# Patient Record
Sex: FEMALE | Race: BLACK OR AFRICAN AMERICAN | NOT HISPANIC OR LATINO | URBAN - METROPOLITAN AREA
[De-identification: names, ages, dates, MRNs, and addresses within clinical notes are randomized per-mention and may not be internally consistent; named-entity substitution may affect disease eponyms.]

---

## 2024-04-26 ENCOUNTER — APPOINTMENT (OUTPATIENT)
Dept: OBGYN | Facility: CLINIC | Age: 24
End: 2024-04-26

## 2024-05-02 ENCOUNTER — APPOINTMENT (OUTPATIENT)
Dept: RADIOLOGY | Facility: IMAGING CENTER | Age: 24
End: 2024-05-02
Payer: MEDICAID

## 2024-05-02 DIAGNOSIS — N91.2 AMENORRHEA: ICD-10-CM

## 2024-05-02 PROCEDURE — 76805 OB US >/= 14 WKS SNGL FETUS: CPT | Mod: TC | Performed by: NURSE PRACTITIONER

## 2024-05-04 PROBLEM — Z34.90 SUPERVISION OF NORMAL PREGNANCY: Status: ACTIVE | Noted: 2024-05-04

## 2024-05-08 ENCOUNTER — HOSPITAL ENCOUNTER (OUTPATIENT)
Facility: MEDICAL CENTER | Age: 24
End: 2024-05-08
Attending: PHYSICIAN ASSISTANT
Payer: MEDICAID

## 2024-05-08 ENCOUNTER — INITIAL PRENATAL (OUTPATIENT)
Dept: OBGYN | Facility: CLINIC | Age: 24
End: 2024-05-08
Payer: MEDICAID

## 2024-05-08 ENCOUNTER — APPOINTMENT (OUTPATIENT)
Dept: OBGYN | Facility: CLINIC | Age: 24
End: 2024-05-08
Payer: MEDICAID

## 2024-05-08 VITALS
DIASTOLIC BLOOD PRESSURE: 64 MMHG | SYSTOLIC BLOOD PRESSURE: 120 MMHG | HEIGHT: 67 IN | BODY MASS INDEX: 29.66 KG/M2 | WEIGHT: 189 LBS

## 2024-05-08 DIAGNOSIS — Z34.03 ENCOUNTER FOR SUPERVISION OF NORMAL FIRST PREGNANCY IN THIRD TRIMESTER: ICD-10-CM

## 2024-05-08 DIAGNOSIS — Z98.891 HISTORY OF C-SECTION: ICD-10-CM

## 2024-05-08 DIAGNOSIS — Z86.2 HISTORY OF ANEMIA: ICD-10-CM

## 2024-05-08 DIAGNOSIS — O09.33 LATE PRENATAL CARE AFFECTING PREGNANCY IN THIRD TRIMESTER: ICD-10-CM

## 2024-05-08 PROCEDURE — 3078F DIAST BP <80 MM HG: CPT | Performed by: PHYSICIAN ASSISTANT

## 2024-05-08 PROCEDURE — 3074F SYST BP LT 130 MM HG: CPT | Performed by: PHYSICIAN ASSISTANT

## 2024-05-08 PROCEDURE — 0500F INITIAL PRENATAL CARE VISIT: CPT | Performed by: PHYSICIAN ASSISTANT

## 2024-05-08 ASSESSMENT — EDINBURGH POSTNATAL DEPRESSION SCALE (EPDS)
I HAVE FELT SAD OR MISERABLE: NOT VERY OFTEN
I HAVE BEEN ABLE TO LAUGH AND SEE THE FUNNY SIDE OF THINGS: AS MUCH AS I ALWAYS COULD
THINGS HAVE BEEN GETTING ON TOP OF ME: YES, SOMETIMES I HAVEN'T BEEN COPING AS WELL AS USUAL
TOTAL SCORE: 7
I HAVE BEEN SO UNHAPPY THAT I HAVE BEEN CRYING: ONLY OCCASIONALLY
I HAVE LOOKED FORWARD WITH ENJOYMENT TO THINGS: AS MUCH AS I EVER DID
THE THOUGHT OF HARMING MYSELF HAS OCCURRED TO ME: NEVER
I HAVE FELT SCARED OR PANICKY FOR NO GOOD REASON: NO, NOT MUCH
I HAVE BLAMED MYSELF UNNECESSARILY WHEN THINGS WENT WRONG: NOT VERY OFTEN
I HAVE BEEN SO UNHAPPY THAT I HAVE HAD DIFFICULTY SLEEPING: NOT VERY OFTEN
I HAVE BEEN ANXIOUS OR WORRIED FOR NO GOOD REASON: NO, NOT AT ALL

## 2024-05-08 NOTE — LETTER
"Count Your Baby's Movements  Another step to a healthy delivery    [facility]  Dept: [phone]    How Many Weeks Pregnant?     Date to Begin Counting:               How to use this chart    One way for your physician to keep track of your baby's health is by knowing how often the baby moves (or \"kicks\") in your womb.  You can help your physician to do this by using this chart every day.    Every day, you should see how many hours it takes for your baby to move 10 times.  Start in the morning, as soon as you get up.    First, write down the time your baby moves until you get to 10.  Check off one box every time your baby moves until you get to 10.  Write down the time you finished counting in the last column.  Total how long it took to count up all 10 movements.  Finally, fill in the box that shows how long this took.  After counting 10 movements, you no longer have to count any more that day.  The next morning, just start counting again as soon as you get up.    What should you call a \"movement\"?  It is hard to say, because it will feel different from one mother to another and from one pregnancy to the next.  The important thing is that you count the movements the same way throughout your pregnancy.  If you have more questions, you should ask your physician.    Count carefully every day!  SAMPLE:  Week 28    How many hours did it take to feel 10 movements?       Start  Time     1     2     3     4     5     6     7     8     9     10   Finish Time   Mon 8:20           11:40   Tue Wed Thu               Fri               Sat               Sun                 IMPORTANT: You should contact your physician if it takes more than two hours for you to feel 10 movements.  Each morning, write down the time and start to count the movements of your baby.  Keep track by checking off one box every time you feel one movement.  When you have felt 10 " "\"kicks\", write down the time you finished counting in the last column.  Then fill in the   box (over the check logan) for the number of hours it took.  Be sure to read the complete instructions on the previous page.            "

## 2024-05-08 NOTE — PROGRESS NOTES
"Subjective     Ling Ojeda is a 23 y.o. female who presents with New ob visit. Pt is unsure of LMP. Dating is by US at 34wk, though pt reports US in [place] in 1st trimester - will request records.   OBHx: Hx primary C/S for NRFHT (dilated to 7 then needed urgent C/S), pt then had PTD/ at ?32 wk (baby weight over 5lb) and  at term - pt denies GDM, PIH or delivery complications  PMHx: Anemia - per last check, HgB ~10  SHX: C/S - denies surgical or anesthetic complications  Allergies: NKDA  Social: Denies tob, etoh or drug use - states she quit tob and marijuana during pregnancy  Meds; Taking PNV  Pt currently denies cramping, bleeding or pain. + FM.             HPI    Review of Systems   All other systems reviewed and are negative.             Objective     /64   Ht 5' 7\"   Wt 189 lb   LMP  (LMP Unknown)   BMI 29.60 kg/m²      Physical Exam  Vitals reviewed.   Constitutional:       Appearance: She is well-developed.   HENT:      Head: Normocephalic and atraumatic.   Eyes:      Pupils: Pupils are equal, round, and reactive to light.   Neck:      Thyroid: No thyromegaly.   Cardiovascular:      Rate and Rhythm: Normal rate and regular rhythm.      Heart sounds: Normal heart sounds.   Pulmonary:      Effort: Pulmonary effort is normal. No respiratory distress.      Breath sounds: Normal breath sounds.   Abdominal:      General: Bowel sounds are normal. There is no distension.      Palpations: Abdomen is soft.      Tenderness: There is no abdominal tenderness.   Genitourinary:     Exam position: Supine.      Labia:         Right: No rash or tenderness.         Left: No rash or tenderness.       Vagina: Normal. No signs of injury and foreign body. No vaginal discharge or erythema.      Cervix: No cervical motion tenderness.      Uterus: Enlarged (Gravid, uterus c/w 35 wk size). Not deviated and not tender.       Adnexa:         Right: No mass or tenderness.          Left: No mass or tenderness.   "   Musculoskeletal:      Cervical back: Normal range of motion and neck supple.   Skin:     General: Skin is warm and dry.      Findings: No erythema.   Neurological:      Mental Status: She is alert.      Deep Tendon Reflexes: Reflexes are normal and symmetric.   Psychiatric:         Behavior: Behavior normal.         Thought Content: Thought content normal.                             Assessment & Plan        1. History of  for NRFHT - wants TOLAC, no BTL  - Desires TOLAC, consent given - pt to bring back signed next visit    2. Encounter for supervision of normal first pregnancy in third trimester  - Late entry PNC - one early US in [place] - records requested  - Anatomy US wnl - pt notified of results  - Last PAP date unsure - will repeat after delivery  - GLUCOSE 1HR GESTATIONAL; Future  - PREG CNTR PRENATAL PN; Future  - URINE DRUG SCREEN W/CONF (AR); Future  - Chlamydia/GC, PCR (Genital/Anal swab); Future    3. Late prenatal care affecting pregnancy in third trimester  - Too late for AFP, declines NIPT    4. History of anemia  - Never tested for sickle cell disease  - Will check with CBC, pt taking PNV tabs

## 2024-05-08 NOTE — PROGRESS NOTES
Pt here today for OBFV   +FM movemnet  No VB, LOF. Uc's   Phone number   Pharmacy verified   YADY given to pt  TDAP-  FLU-  BTL-

## 2024-05-08 NOTE — PROGRESS NOTES
NOB today  LMP: unknown   Last pap:  unsure   Phone # [phone]  Pharmacy confirmed  On PNV  Genetic screening   EPDS= 7   c/o  wants to talk about getting induce   YADY given to pt   TDAP- declined   BTL- wants to go over

## 2024-05-09 LAB
C TRACH DNA GENITAL QL NAA+PROBE: NEGATIVE
N GONORRHOEA DNA GENITAL QL NAA+PROBE: NEGATIVE
SPECIMEN SOURCE: NORMAL

## 2024-05-10 ENCOUNTER — HOSPITAL ENCOUNTER (OUTPATIENT)
Dept: LAB | Facility: MEDICAL CENTER | Age: 24
End: 2024-05-10
Attending: PHYSICIAN ASSISTANT
Payer: MEDICAID

## 2024-05-10 DIAGNOSIS — Z34.03 ENCOUNTER FOR SUPERVISION OF NORMAL FIRST PREGNANCY IN THIRD TRIMESTER: ICD-10-CM

## 2024-05-10 LAB
ABO GROUP BLD: NORMAL
BLD GP AB SCN SERPL QL: NORMAL
ERYTHROCYTE [DISTWIDTH] IN BLOOD BY AUTOMATED COUNT: 41.6 FL (ref 35.9–50)
GLUCOSE 1H P 50 G GLC PO SERPL-MCNC: 72 MG/DL (ref 70–139)
HBV SURFACE AG SER QL: ABNORMAL
HCT VFR BLD AUTO: 33.3 % (ref 37–47)
HCV AB SER QL: ABNORMAL
HGB BLD-MCNC: 11.1 G/DL (ref 12–16)
HIV 1+2 AB+HIV1 P24 AG SERPL QL IA: NORMAL
MCH RBC QN AUTO: 30.1 PG (ref 27–33)
MCHC RBC AUTO-ENTMCNC: 33.3 G/DL (ref 32.2–35.5)
MCV RBC AUTO: 90.2 FL (ref 81.4–97.8)
PLATELET # BLD AUTO: 250 K/UL (ref 164–446)
PMV BLD AUTO: 10.4 FL (ref 9–12.9)
RBC # BLD AUTO: 3.69 M/UL (ref 4.2–5.4)
RH BLD: NORMAL
RUBV AB SER QL: 164 IU/ML
T PALLIDUM AB SER QL IA: ABNORMAL
WBC # BLD AUTO: 8.9 K/UL (ref 4.8–10.8)

## 2024-05-12 LAB
AMPHET CTO UR CFM-MCNC: NEGATIVE NG/ML
BACTERIA UR CULT: NORMAL
BARBITURATES CTO UR CFM-MCNC: NEGATIVE NG/ML
BENZODIAZ CTO UR CFM-MCNC: NEGATIVE NG/ML
CANNABINOIDS CTO UR CFM-MCNC: NEGATIVE NG/ML
COCAINE CTO UR CFM-MCNC: NEGATIVE NG/ML
CREAT UR-MCNC: 52.5 MG/DL (ref 20–400)
DRUG COMMENT 753798: NORMAL
METHADONE CTO UR CFM-MCNC: NEGATIVE NG/ML
OPIATES CTO UR CFM-MCNC: NEGATIVE NG/ML
PCP CTO UR CFM-MCNC: NEGATIVE NG/ML
PROPOXYPH CTO UR CFM-MCNC: NEGATIVE NG/ML
SIGNIFICANT IND 70042: NORMAL
SITE SITE: NORMAL
SOURCE SOURCE: NORMAL

## 2024-05-14 ENCOUNTER — HOSPITAL ENCOUNTER (OUTPATIENT)
Facility: MEDICAL CENTER | Age: 24
End: 2024-05-14
Payer: MEDICAID

## 2024-05-14 ENCOUNTER — ROUTINE PRENATAL (OUTPATIENT)
Dept: OBGYN | Facility: CLINIC | Age: 24
End: 2024-05-14
Payer: MEDICAID

## 2024-05-14 VITALS — BODY MASS INDEX: 29.44 KG/M2 | SYSTOLIC BLOOD PRESSURE: 100 MMHG | WEIGHT: 188 LBS | DIASTOLIC BLOOD PRESSURE: 60 MMHG

## 2024-05-14 DIAGNOSIS — Z98.891 HISTORY OF C-SECTION: ICD-10-CM

## 2024-05-14 DIAGNOSIS — Z34.03 ENCOUNTER FOR SUPERVISION OF NORMAL FIRST PREGNANCY IN THIRD TRIMESTER: ICD-10-CM

## 2024-05-14 PROCEDURE — 3078F DIAST BP <80 MM HG: CPT

## 2024-05-14 PROCEDURE — 3074F SYST BP LT 130 MM HG: CPT

## 2024-05-14 PROCEDURE — 0502F SUBSEQUENT PRENATAL CARE: CPT

## 2024-05-14 NOTE — PROGRESS NOTES
S: Pt is a 23 y.o.  at 36w0d gestation here today for routine prenatal care. Reports feeling overall well. Good FM, no LOF or VB. Reports some lower pelvic pain. Would like elective IOL close to 39 weeks.    O: /60   Wt 188 lb    *see prenatal flowsheet*     Labs:        GCT: 72   Vertex by BSUS    A: IUP at 36w0d       S=D         Patient Active Problem List    Diagnosis Date Noted    History of C/S x 1 for NRFHT with 2 VBACs - desires TOLAC, no BTL/BS 2024    Late prenatal care affecting pregnancy in third trimester - early US in [facility] - records requested 2024    History of anemia 2024    Supervision of normal pregnancy 2024     P:   -Discussed normal s/sx pregnancy appropriate for gestational age and labor warning signs vs general discomforts. Reviewed fetal movements appropriate for EGA and kick counts. Reinforced adequate hydration and nutrition.  -Discussed labor precautions, including 5-1-1 rule. Instructed to report to hospital with leaking of fluid, concerns for decreased FM, excessive vaginal bleeding.   -GBS collected today  -Discussed signs and symptoms of preeclampsia, including HA that is not resolved by rest/Tylenol/hydration, changes in vision, RUQ pain, or sudden increase in swelling.   -TOLAC counseling at [facility] in 1 weeks for routine prenatal care      Mia Rios C.N.M.

## 2024-05-14 NOTE — PROGRESS NOTES
Pt here today for OBFV   +FM movemnet  No VB, LOF. Uc's   Phone number /Pharmacy verified   GBS- today

## 2024-05-15 ENCOUNTER — TELEPHONE (OUTPATIENT)
Dept: OBGYN | Facility: CLINIC | Age: 24
End: 2024-05-15
Payer: MEDICAID

## 2024-05-15 NOTE — TELEPHONE ENCOUNTER
Received early ultra sound fax from [facility]. See media 05-15-24 for results.

## 2024-05-16 LAB — GP B STREP DNA SPEC QL NAA+PROBE: NEGATIVE

## 2024-05-24 ENCOUNTER — ROUTINE PRENATAL (OUTPATIENT)
Dept: OBGYN | Facility: CLINIC | Age: 24
End: 2024-05-24
Payer: MEDICAID

## 2024-05-24 VITALS — DIASTOLIC BLOOD PRESSURE: 54 MMHG | WEIGHT: 194.2 LBS | BODY MASS INDEX: 30.42 KG/M2 | SYSTOLIC BLOOD PRESSURE: 98 MMHG

## 2024-05-24 DIAGNOSIS — Z98.891 HISTORY OF C-SECTION: Primary | ICD-10-CM

## 2024-05-24 PROCEDURE — 0502F SUBSEQUENT PRENATAL CARE: CPT | Performed by: OBSTETRICS & GYNECOLOGY

## 2024-05-24 PROCEDURE — 3074F SYST BP LT 130 MM HG: CPT | Performed by: OBSTETRICS & GYNECOLOGY

## 2024-05-24 PROCEDURE — 3078F DIAST BP <80 MM HG: CPT | Performed by: OBSTETRICS & GYNECOLOGY

## 2024-05-24 NOTE — PROGRESS NOTES
S: Pt presents for routine OB follow up. Good fetal movement.  Reports intermittent contractions. Denies vaginal bleeding or leakage of fluid.    Questions answered.    O: BP 98/54   Wt 194 lb 3.2 oz   LMP  (LMP Unknown)   BMI 30.42 kg/m²   Patients' weight gain, fluid intake and exercise level discussed.  Vitals, fundal height , fetal position, and FHR reviewed on flowsheet    A/P:  23 y.o.  at 37w3d presents for routine obstetric follow-up.  Size equals dates and/or scan    Diagnoses and all orders for this visit:    History of C/S x 1 for NRFHT with 2 VBACs - desires TOLAC, no BTL/BS    - I discussed risks including uterine rupture and need for an emergency .  If this occurs, it can be catastrophic and result in lower Apgar scores and decreased blood flow to the baby.  In very rare, rare circumstances it can result in fetal death or neurological injury. I discussed that we will monitor the baby throughout her labor and if there are any signs of fetal distress, then we may move towards .  Benefits of  include less pain after delivery and earlier recovery. Also, if she desires a large family, it would be beneficial to try for vaginal birth to avoid multiple c-sections. TOLAC form signed. She is a good candidate because she has a history of two prior vaginal deliveries after .     She also desires elective IOL after 39w. She has been induced previously and understand that her labor may be longer if she is induced. Also, we can only give certain medications for induction if she has a history of a prior . All questions answered.      1.  Continue prenatal vitamins.  2.  Fetal kick counts.  3.  Exercise at least 30 minutes daily.  4.  Drink at least 2L of water daily  5.  Labor precautions educated.  6.  Follow-up in 1 weeks.    Shanel Rooney M.D.

## 2024-05-24 NOTE — PROGRESS NOTES
Pt here today for OBFV   +FM movemnet  No VB, LOF.   Uc's far apart   Phone number [phone]  Pharmacy verified   Pt states want to consult for a  and wants to get induced    GBS (-)

## 2024-05-30 ENCOUNTER — ROUTINE PRENATAL (OUTPATIENT)
Dept: OBGYN | Facility: CLINIC | Age: 24
End: 2024-05-30
Payer: MEDICAID

## 2024-05-30 VITALS — WEIGHT: 198 LBS | BODY MASS INDEX: 31.01 KG/M2 | DIASTOLIC BLOOD PRESSURE: 60 MMHG | SYSTOLIC BLOOD PRESSURE: 120 MMHG

## 2024-05-30 DIAGNOSIS — Z34.83 ENCOUNTER FOR SUPERVISION OF OTHER NORMAL PREGNANCY IN THIRD TRIMESTER: ICD-10-CM

## 2024-05-30 NOTE — PROGRESS NOTES
Pt here today for OBFV   +FM movemnet  No VB, LOF. Uc's   Phone number   Pharmacy verified   IOL- 6/5/24 instructions given     Would like her cervix checked

## 2024-05-30 NOTE — PROGRESS NOTES
S: Pt is a 23 y.o.  at 38w2d gestation here today for routine prenatal care. Reports feeling well overall, ready for labor and birth. Reports some BH contractions. No LOF or VB.     Some leg and hand swelling. Denies headache, visual changes, or epigastric pain.     O: /60   Wt 198 lb    *see prenatal flowsheet*     Labs:        GCT: 72        GBS: negative  Vertex by BSUS    A: IUP at 38w2d       S=D         Patient Active Problem List    Diagnosis Date Noted    History of C/S x 1 for NRFHT with 2 VBACs - desires TOLAC, no BTL/BS 2024    Late prenatal care affecting pregnancy in third trimester - early US in [facility] - records requested 2024    History of anemia 2024    Supervision of normal pregnancy 2024     P:   -Discussed normal s/sx pregnancy appropriate for gestational age and labor warning signs vs general discomforts. Reviewed fetal movements appropriate for EGA and kick counts. Reinforced adequate hydration and nutrition.  -Discussed labor precautions, including 5-1-1 rule. Instructed to report to hospital with leaking of fluid, concerns for decreased FM, excessive vaginal bleeding.   -Discussed signs and symptoms of preeclampsia, including HA that is not resolved by rest/Tylenol/hydration, changes in vision, RUQ pain, or sudden increase in swelling.   -SVE and sweep at [place] if desired  -IOL next week scheduled  -RTC in 1 weeks for routine prenatal care      Mia Rios C.N.M.

## 2024-06-01 ENCOUNTER — HOSPITAL ENCOUNTER (INPATIENT)
Facility: MEDICAL CENTER | Age: 24
LOS: 1 days | End: 2024-06-02
Attending: OBSTETRICS & GYNECOLOGY | Admitting: OBSTETRICS & GYNECOLOGY
Payer: MEDICAID

## 2024-06-01 ENCOUNTER — HOSPITAL ENCOUNTER (EMERGENCY)
Facility: MEDICAL CENTER | Age: 24
End: 2024-06-01
Payer: MEDICAID

## 2024-06-01 LAB
BASOPHILS # BLD AUTO: 0.3 % (ref 0–1.8)
BASOPHILS # BLD: 0.03 K/UL (ref 0–0.12)
EOSINOPHIL # BLD AUTO: 0.2 K/UL (ref 0–0.51)
EOSINOPHIL NFR BLD: 1.8 % (ref 0–6.9)
ERYTHROCYTE [DISTWIDTH] IN BLOOD BY AUTOMATED COUNT: 43.8 FL (ref 35.9–50)
HCT VFR BLD AUTO: 32.2 % (ref 37–47)
HGB BLD-MCNC: 10.6 G/DL (ref 12–16)
HOLDING TUBE BB 8507: NORMAL
IMM GRANULOCYTES # BLD AUTO: 0.46 K/UL (ref 0–0.11)
IMM GRANULOCYTES NFR BLD AUTO: 4.2 % (ref 0–0.9)
LYMPHOCYTES # BLD AUTO: 2.29 K/UL (ref 1–4.8)
LYMPHOCYTES NFR BLD: 21 % (ref 22–41)
MCH RBC QN AUTO: 29.5 PG (ref 27–33)
MCHC RBC AUTO-ENTMCNC: 32.9 G/DL (ref 32.2–35.5)
MCV RBC AUTO: 89.7 FL (ref 81.4–97.8)
MONOCYTES # BLD AUTO: 1.21 K/UL (ref 0–0.85)
MONOCYTES NFR BLD AUTO: 11.1 % (ref 0–13.4)
NEUTROPHILS # BLD AUTO: 6.71 K/UL (ref 1.82–7.42)
NEUTROPHILS NFR BLD: 61.6 % (ref 44–72)
NRBC # BLD AUTO: 0 K/UL
NRBC BLD-RTO: 0 /100 WBC (ref 0–0.2)
PLATELET # BLD AUTO: 230 K/UL (ref 164–446)
PMV BLD AUTO: 9.7 FL (ref 9–12.9)
RBC # BLD AUTO: 3.59 M/UL (ref 4.2–5.4)
T PALLIDUM AB SER QL IA: NORMAL
WBC # BLD AUTO: 10.9 K/UL (ref 4.8–10.8)

## 2024-06-01 PROCEDURE — A9270 NON-COVERED ITEM OR SERVICE: HCPCS | Performed by: OBSTETRICS & GYNECOLOGY

## 2024-06-01 PROCEDURE — 86780 TREPONEMA PALLIDUM: CPT

## 2024-06-01 PROCEDURE — 700102 HCHG RX REV CODE 250 W/ 637 OVERRIDE(OP): Performed by: OBSTETRICS & GYNECOLOGY

## 2024-06-01 PROCEDURE — 85025 COMPLETE CBC W/AUTO DIFF WBC: CPT

## 2024-06-01 PROCEDURE — 700111 HCHG RX REV CODE 636 W/ 250 OVERRIDE (IP): Mod: UD

## 2024-06-01 PROCEDURE — 59409 OBSTETRICAL CARE: CPT

## 2024-06-01 PROCEDURE — 770002 HCHG ROOM/CARE - OB PRIVATE (112)

## 2024-06-01 PROCEDURE — A9270 NON-COVERED ITEM OR SERVICE: HCPCS

## 2024-06-01 PROCEDURE — 700102 HCHG RX REV CODE 250 W/ 637 OVERRIDE(OP)

## 2024-06-01 PROCEDURE — 36415 COLL VENOUS BLD VENIPUNCTURE: CPT

## 2024-06-01 PROCEDURE — 304965 HCHG RECOVERY SERVICES

## 2024-06-01 RX ORDER — TERBUTALINE SULFATE 1 MG/ML
0.25 INJECTION, SOLUTION SUBCUTANEOUS
Status: DISCONTINUED | OUTPATIENT
Start: 2024-06-01 | End: 2024-06-02 | Stop reason: HOSPADM

## 2024-06-01 RX ORDER — OXYTOCIN 10 [USP'U]/ML
10 INJECTION, SOLUTION INTRAMUSCULAR; INTRAVENOUS
Status: COMPLETED | OUTPATIENT
Start: 2024-06-01 | End: 2024-06-01

## 2024-06-01 RX ORDER — ACETAMINOPHEN 500 MG
1000 TABLET ORAL
Status: COMPLETED | OUTPATIENT
Start: 2024-06-01 | End: 2024-06-01

## 2024-06-01 RX ORDER — IBUPROFEN 800 MG/1
800 TABLET ORAL
Status: DISCONTINUED | OUTPATIENT
Start: 2024-06-01 | End: 2024-06-01

## 2024-06-01 RX ORDER — MISOPROSTOL 200 UG/1
800 TABLET ORAL ONCE
Status: COMPLETED | OUTPATIENT
Start: 2024-06-01 | End: 2024-06-01

## 2024-06-01 RX ORDER — SODIUM CHLORIDE, SODIUM LACTATE, POTASSIUM CHLORIDE, CALCIUM CHLORIDE 600; 310; 30; 20 MG/100ML; MG/100ML; MG/100ML; MG/100ML
INJECTION, SOLUTION INTRAVENOUS CONTINUOUS
Status: DISCONTINUED | OUTPATIENT
Start: 2024-06-01 | End: 2024-06-02 | Stop reason: HOSPADM

## 2024-06-01 RX ORDER — MISOPROSTOL 200 UG/1
TABLET ORAL
Status: COMPLETED
Start: 2024-06-01 | End: 2024-06-01

## 2024-06-01 RX ORDER — OXYTOCIN 10 [USP'U]/ML
INJECTION, SOLUTION INTRAMUSCULAR; INTRAVENOUS
Status: COMPLETED
Start: 2024-06-01 | End: 2024-06-01

## 2024-06-01 RX ORDER — METHYLERGONOVINE MALEATE 0.2 MG/ML
INJECTION INTRAVENOUS
Status: COMPLETED
Start: 2024-06-01 | End: 2024-06-01

## 2024-06-01 RX ORDER — LIDOCAINE HYDROCHLORIDE 10 MG/ML
20 INJECTION, SOLUTION INFILTRATION; PERINEURAL
Status: DISCONTINUED | OUTPATIENT
Start: 2024-06-01 | End: 2024-06-02 | Stop reason: HOSPADM

## 2024-06-01 RX ORDER — METHYLERGONOVINE MALEATE 0.2 MG/ML
0.2 INJECTION INTRAVENOUS ONCE
Status: COMPLETED | OUTPATIENT
Start: 2024-06-01 | End: 2024-06-01

## 2024-06-01 RX ORDER — IBUPROFEN 800 MG/1
800 TABLET ORAL EVERY 8 HOURS PRN
Status: DISCONTINUED | OUTPATIENT
Start: 2024-06-01 | End: 2024-06-02 | Stop reason: HOSPADM

## 2024-06-01 RX ADMIN — OXYTOCIN: 10 INJECTION, SOLUTION INTRAMUSCULAR; INTRAVENOUS at 18:15

## 2024-06-01 RX ADMIN — ACETAMINOPHEN 1000 MG: 500 TABLET, FILM COATED ORAL at 18:54

## 2024-06-01 RX ADMIN — MISOPROSTOL 800 MCG: 200 TABLET ORAL at 18:15

## 2024-06-01 RX ADMIN — IBUPROFEN 800 MG: 800 TABLET, FILM COATED ORAL at 19:52

## 2024-06-01 ASSESSMENT — LIFESTYLE VARIABLES: EVER_SMOKED: NEVER

## 2024-06-01 ASSESSMENT — PATIENT HEALTH QUESTIONNAIRE - PHQ9
2. FEELING DOWN, DEPRESSED, IRRITABLE, OR HOPELESS: NOT AT ALL
SUM OF ALL RESPONSES TO PHQ9 QUESTIONS 1 AND 2: 0
1. LITTLE INTEREST OR PLEASURE IN DOING THINGS: NOT AT ALL

## 2024-06-01 ASSESSMENT — PAIN DESCRIPTION - PAIN TYPE
TYPE: ACUTE PAIN
TYPE: ACUTE PAIN

## 2024-06-02 VITALS
BODY MASS INDEX: 31.39 KG/M2 | SYSTOLIC BLOOD PRESSURE: 107 MMHG | TEMPERATURE: 98.3 F | OXYGEN SATURATION: 98 % | DIASTOLIC BLOOD PRESSURE: 57 MMHG | HEIGHT: 67 IN | HEART RATE: 83 BPM | RESPIRATION RATE: 16 BRPM | WEIGHT: 200 LBS

## 2024-06-02 LAB
ERYTHROCYTE [DISTWIDTH] IN BLOOD BY AUTOMATED COUNT: 42.8 FL (ref 35.9–50)
HCT VFR BLD AUTO: 34.4 % (ref 37–47)
HGB BLD-MCNC: 11.5 G/DL (ref 12–16)
MCH RBC QN AUTO: 29.5 PG (ref 27–33)
MCHC RBC AUTO-ENTMCNC: 33.4 G/DL (ref 32.2–35.5)
MCV RBC AUTO: 88.2 FL (ref 81.4–97.8)
PLATELET # BLD AUTO: 266 K/UL (ref 164–446)
PMV BLD AUTO: 9.8 FL (ref 9–12.9)
RBC # BLD AUTO: 3.9 M/UL (ref 4.2–5.4)
WBC # BLD AUTO: 15.6 K/UL (ref 4.8–10.8)

## 2024-06-02 PROCEDURE — 3E0234Z INTRODUCTION OF SERUM, TOXOID AND VACCINE INTO MUSCLE, PERCUTANEOUS APPROACH: ICD-10-PCS | Performed by: OBSTETRICS & GYNECOLOGY

## 2024-06-02 PROCEDURE — 90715 TDAP VACCINE 7 YRS/> IM: CPT

## 2024-06-02 PROCEDURE — 90471 IMMUNIZATION ADMIN: CPT

## 2024-06-02 PROCEDURE — 36415 COLL VENOUS BLD VENIPUNCTURE: CPT

## 2024-06-02 PROCEDURE — A9270 NON-COVERED ITEM OR SERVICE: HCPCS

## 2024-06-02 PROCEDURE — 700102 HCHG RX REV CODE 250 W/ 637 OVERRIDE(OP)

## 2024-06-02 PROCEDURE — 700111 HCHG RX REV CODE 636 W/ 250 OVERRIDE (IP)

## 2024-06-02 PROCEDURE — 85027 COMPLETE CBC AUTOMATED: CPT

## 2024-06-02 RX ORDER — SIMETHICONE 125 MG
125 TABLET,CHEWABLE ORAL 4 TIMES DAILY PRN
Status: DISCONTINUED | OUTPATIENT
Start: 2024-06-02 | End: 2024-06-02 | Stop reason: HOSPADM

## 2024-06-02 RX ORDER — SODIUM CHLORIDE, SODIUM LACTATE, POTASSIUM CHLORIDE, CALCIUM CHLORIDE 600; 310; 30; 20 MG/100ML; MG/100ML; MG/100ML; MG/100ML
INJECTION, SOLUTION INTRAVENOUS PRN
Status: DISCONTINUED | OUTPATIENT
Start: 2024-06-02 | End: 2024-06-02 | Stop reason: HOSPADM

## 2024-06-02 RX ORDER — ACETAMINOPHEN 500 MG
1000 TABLET ORAL EVERY 6 HOURS PRN
Qty: 30 TABLET | Refills: 0 | Status: SHIPPED | OUTPATIENT
Start: 2024-06-02

## 2024-06-02 RX ORDER — POLYETHYLENE GLYCOL 3350 17 G/17G
1 POWDER, FOR SOLUTION ORAL
Status: DISCONTINUED | OUTPATIENT
Start: 2024-06-02 | End: 2024-06-02 | Stop reason: HOSPADM

## 2024-06-02 RX ORDER — IBUPROFEN 800 MG/1
800 TABLET ORAL EVERY 8 HOURS PRN
Qty: 30 TABLET | Refills: 0 | Status: SHIPPED | OUTPATIENT
Start: 2024-06-02

## 2024-06-02 RX ORDER — DOCUSATE SODIUM 100 MG/1
100 CAPSULE, LIQUID FILLED ORAL 2 TIMES DAILY PRN
Qty: 30 CAPSULE | Refills: 0 | Status: SHIPPED | OUTPATIENT
Start: 2024-06-02 | End: 2024-06-09

## 2024-06-02 RX ORDER — ACETAMINOPHEN 500 MG
1000 TABLET ORAL EVERY 6 HOURS PRN
Status: DISCONTINUED | OUTPATIENT
Start: 2024-06-02 | End: 2024-06-02 | Stop reason: HOSPADM

## 2024-06-02 RX ADMIN — IBUPROFEN 800 MG: 800 TABLET, FILM COATED ORAL at 04:07

## 2024-06-02 RX ADMIN — ACETAMINOPHEN 1000 MG: 500 TABLET, FILM COATED ORAL at 12:56

## 2024-06-02 RX ADMIN — TETANUS TOXOID, REDUCED DIPHTHERIA TOXOID AND ACELLULAR PERTUSSIS VACCINE, ADSORBED 0.5 ML: 5; 2.5; 8; 8; 2.5 SUSPENSION INTRAMUSCULAR at 16:40

## 2024-06-02 RX ADMIN — ACETAMINOPHEN 1000 MG: 500 TABLET, FILM COATED ORAL at 04:07

## 2024-06-02 RX ADMIN — IBUPROFEN 800 MG: 800 TABLET, FILM COATED ORAL at 12:56

## 2024-06-02 ASSESSMENT — PAIN DESCRIPTION - PAIN TYPE
TYPE: ACUTE PAIN;SURGICAL PAIN
TYPE: ACUTE PAIN

## 2024-06-02 ASSESSMENT — EDINBURGH POSTNATAL DEPRESSION SCALE (EPDS)
I HAVE BEEN ABLE TO LAUGH AND SEE THE FUNNY SIDE OF THINGS: AS MUCH AS I ALWAYS COULD
I HAVE BLAMED MYSELF UNNECESSARILY WHEN THINGS WENT WRONG: NOT VERY OFTEN
I HAVE LOOKED FORWARD WITH ENJOYMENT TO THINGS: AS MUCH AS I EVER DID
THE THOUGHT OF HARMING MYSELF HAS OCCURRED TO ME: NEVER
I HAVE BEEN SO UNHAPPY THAT I HAVE HAD DIFFICULTY SLEEPING: NOT VERY OFTEN
I HAVE BEEN SO UNHAPPY THAT I HAVE BEEN CRYING: ONLY OCCASIONALLY
I HAVE BEEN ANXIOUS OR WORRIED FOR NO GOOD REASON: HARDLY EVER
I HAVE FELT SCARED OR PANICKY FOR NO GOOD REASON: NO, NOT MUCH
THINGS HAVE BEEN GETTING ON TOP OF ME: NO, MOST OF THE TIME I HAVE COPED QUITE WELL
I HAVE FELT SAD OR MISERABLE: NOT VERY OFTEN

## 2024-06-02 NOTE — CARE PLAN
The patient is Stable - Low risk of patient condition declining or worsening    Shift Goals  Clinical Goals: VS and Lochia WNL  Patient Goals: rest    Progress made toward(s) clinical / shift goals:    Problem: Knowledge Deficit - L&D  Goal: Patient and family/caregivers will demonstrate understanding of plan of care, disease process/condition, diagnostic tests and medications  Outcome: Progressing  Note: Oriented patient to unit, discussed bed controls and call light. Educated patient on labor and delivery process. Discussed POC with patient and support persons, patient agrees to and verbalized understanding of POC. All questions answered at this time.      Problem: Pain - Standard  Goal: Alleviation of pain or a reduction in pain to the patient’s comfort goal  Outcome: Progressing  Flowsheets (Taken 6/1/2024 2033)  Pain Rating Scale (NPRS): 5  Note: Patient reports decrease in pain post interventions. Patient able to notify RN when interventions are needed.       Patient is not progressing towards the following goals:

## 2024-06-02 NOTE — PROGRESS NOTES
0800 Assessment completed. Lochia light, fundus firm. Plan of care reviewed. Declines pain intervention at this time, will call if pain intervention needed.

## 2024-06-02 NOTE — CARE PLAN
The patient is Stable - Low risk of patient condition declining or worsening    Shift Goals  Clinical Goals: Patiet VSS; Lochia WDL; Pain WDL  Patient Goals: rest    Progress made toward(s) clinical / shift goals:    Problem: Knowledge Deficit - L&D  Goal: Patient and family/caregivers will demonstrate understanding of plan of care, disease process/condition, diagnostic tests and medications  Outcome: Met     Problem: Risk for Excess Fluid Volume  Goal: Patient will demonstrate pulse, blood pressure and neurologic signs within expected ranges and without any respiratory complications  Outcome: Met     Problem: Pain - Standard  Goal: Alleviation of pain or a reduction in pain to the patient’s comfort goal  Outcome: Met     Problem: Knowledge Deficit - Standard  Goal: Patient and family/care givers will demonstrate understanding of plan of care, disease process/condition, diagnostic tests and medications  Outcome: Met     Problem: Knowledge Deficit - Postpartum  Goal: Patient will verbalize and demonstrate understanding of self and infant care  Outcome: Met     Problem: Psychosocial - Postpartum  Goal: Patient will verbalize and demonstrate effective bonding and parenting behavior  Outcome: Met     Problem: Altered Physiologic Condition  Goal: Patient physiologically stable as evidenced by normal lochia, palpable uterine involution and vitals within normal limits  Outcome: Met     Problem: Infection - Postpartum  Goal: Postpartum patient will be free of signs and symptoms of infection  Outcome: Met       Patient is not progressing towards the following goals:

## 2024-06-02 NOTE — DISCHARGE SUMMARY
Discharge Summary:     Date of Admission: 2024  Date of Discharge: 24    Admitting diagnosis:    1. Pregnancy @ 38w4d  2. Active labor   3. History of C/S x 1 for NRFHT with 2 VBACs   4. Late prenatal care  5. History of anemia     Discharge Diagnosis:   1. Status post vaginal, spontaneous.  2. Same as above     Past Medical History:   Diagnosis Date    Iron deficiency     Urinary tract infection      OB History    Para Term  AB Living   4 4 3 1   4   SAB IAB Ectopic Molar Multiple Live Births           0 4      # Outcome Date GA Lbr Ramy/2nd Weight Sex Delivery Anes PTL Lv   4 Term 24 38w4d  2.965 kg (6 lb 8.6 oz) F Vag-Spont None N DULCE   3 Term 23 37w0d  2.994 kg (6 lb 9.6 oz) M    DULCE   2  20 32w0d  2.495 kg (5 lb 8 oz) M  EPI  DULCE   1 Term 19 39w0d  3.629 kg (8 lb) M CS-LTranv   DULCE     Past Surgical History:   Procedure Laterality Date    PRIMARY C SECTION  2019     Patient has no known allergies.    Patient Active Problem List   Diagnosis    Supervision of normal pregnancy    History of C/S x 1 for NRFHT with 2 VBACs - desires TOLAC, no BTL/BS    Late prenatal care affecting pregnancy in third trimester - early US in [facility] - records requested    History of anemia    Labor and delivery indication for care or intervention    Indication for care in labor or delivery       Hospital Course:   Pt is a 23 y.o. now  who presented on 2024 for active labor and underwent precipitous delivery. Single female infant was delivered via  at 1810 under no anesthesia; no lacerations were noted. Apgars 8, 9 at 1 and 5 minutes respectively.  ml.     Postpartum course notable for early ambulation, well managed pain, tolerance of diet, spontaneous voiding, and appropriate feeding of infant. She has remained afebrile and blood pressure has been well controlled. All maternal questions and concerns addressed.     Physical Exam:  Temp:  [36.6  °C (97.8 °F)-37 °C (98.6 °F)] 36.8 °C (98.3 °F)  Pulse:  [73-97] 83  Resp:  [16-22] 16  BP: (101-121)/(51-68) 107/57  SpO2:  [96 %-98 %] 98 %  Physical Exam  General: well appearing, no apparent distress  Abdomen: soft, nontender, nondistended  Fundus: firm at level below umbilicus, nontender   Perineum: Deferred  Extremities: symmetric, no peripheral edema, calves nontender    Current Facility-Administered Medications   Medication Dose    lactated ringers infusion      acetaminophen (Tylenol) tablet 1,000 mg  1,000 mg    tetanus-dipth-acell pertussis (Tdap) inj 0.5 mL  0.5 mL    measles, mumps and rubella vaccine (Mmr) injection 0.5 mL  0.5 mL    polyethylene glycol/lytes (Miralax) Packet 1 Packet  1 Packet    simethicone (Mylicon) chewable tablet 125 mg  125 mg    LR infusion      oxytocin (Pitocin) infusion bolus (for post delivery)  20 Units    Followed by    oxytocin (Pitocin) infusion (for post delivery)  125 mL/hr    ibuprofen (Motrin) tablet 800 mg  800 mg       Recent Labs     06/01/24  1857 06/02/24  0504   WBC 10.9* 15.6*   RBC 3.59* 3.90*   HEMOGLOBIN 10.6* 11.5*   HEMATOCRIT 32.2* 34.4*   MCV 89.7 88.2   MCH 29.5 29.5   MCHC 32.9 33.4   RDW 43.8 42.8   PLATELETCT 230 266   MPV 9.7 9.8       Activity/ Discharge Instructions::   Discharge to home  Pelvic Rest x 6 weeks  No heavy lifting x4 weeks  Call or come to ED for: heavy vaginal bleeding, fever >100.4, severe abdominal pain, severe headache, chest pain, shortness of breath,  N/V, or other concerns.    Follow up:  [facility] in 5 weeks for vaginal delivery    Discharge Meds:   Current Outpatient Medications   Medication Sig Dispense Refill    acetaminophen (TYLENOL) 500 MG Tab Take 2 Tablets by mouth every 6 hours as needed for Mild Pain or Moderate Pain. 30 Tablet 0    ibuprofen (MOTRIN) 800 MG Tab Take 1 Tablet by mouth every 8 hours as needed for Mild Pain or Moderate Pain. 30 Tablet 0    docusate sodium (COLACE) 100 MG Cap Take 1  Capsule by mouth 2 times a day as needed for Constipation for up to 7 days. 30 Capsule 0       Rosmery Naqvi D.O.  PGY-1, [facility]

## 2024-06-02 NOTE — DISCHARGE PLANNING
"Discharge Planning Assessment Post Partum    Spoke with Mom at bedside    Report made to [facility] worker Jacki for +THC use. Per Jacki Information only at this time unless calls back after talking to Supervisor.    Reason for Referral: \"MOB history of anxiety and THC use. This patient has no support system. She has some family that live a couple hours away however she is living alone with her children with no friends or family to assist her.\"   Address: [facility] [address] [place] [address] [place] [address]  Type of Living Situation: Lives with 3 other children - Recently moved to this address, was previously living in [facility] since moving to [place] in February. Moved from [place] needing \"a change\"   Mom Diagnosis: Pregnancy; Vaginal delivery ( x3)  Baby Diagnosis:  38w4d  Primary Language: English    Name of Baby: Still deciding  Father of the Baby:   Involved in baby’s care? N/a  Contact Information: n/a    Prenatal Care: LPNC - 1 ultrasound visit in [place], then started PNC at [facility]  Mom's PCP: [facility]  PCP for new baby:Dr. Ridley    Support System: Family in [place], says have made some friends since moving here  Coping/Bonding between mother & baby: Yes  Source of Feeding: Bottle/Formula  Supplies for Infant: Needs Formula, Crib (discussed not having baby sleep in bed with her and what other alternatives may be until pt is able to get a crib), more diapers (has a small pack)    Mom's Insurance: [place] Medicaid  Baby Covered on Insurance:Yes  Mother Employed/School: No  Other children in the home/names & ages: Arpan (: 19); Sutherland (: 20), Knowledge (: 23)    Financial Hardship/Income: Still getting set up with resources. [facility] assisted in starting the process.   Mom's Mental status: Alert and Oriented, Flat affect at first. Does smile at times.  Services used prior to admit: Has a housing voucher that will pay rent x12 months, [facility], " "will have TANF once completes 2 classes    CPS History: Denies  Psychiatric History: Anxiety and Depression - \"I read books and journal to help with my anxiety. I had depression when I was a child. I also talk to my sisters sometimes.\" Counseling Resources given.   Domestic Violence History: Denies  Drug/ETOH History: THC - \"I smoke it occasionally for my anxiety\" Discussed with mom the dangers of smoking around children and to keep supplies stored away from children and high enough they can't get to them. Mom plans to keep using since it helps with her anxiety.     Resources Provided: Outpatient Mental Health/Counseling Resources, Action Plan for Depression and Anxiety Around Pregnancy, Postpartum Resources, Children and Family Helpful Resources in [place]  Referrals Made: Juanpabloer Hitesh     Clearance for Discharge: Infant cleared to discharge home with mother once medically cleared.     Note copied to Baby's chart: Breannas Camila Ojeda MRN [id number]  "

## 2024-06-02 NOTE — DISCHARGE PLANNING
Medical Social Work     SHIKHA met with the pt at bedside and she has her 3 children in the room with her and her . The pt stated she does not have any family or friends here in [place] to help her. The pt stated she just moved from [place], [place]. The pt stated her sister is driving from [place] right now and she should be here in a couple of hours. The pt sister will be picking up her kids as soon as she gets into town. SHIKHA updated the charge RN.

## 2024-06-02 NOTE — PROGRESS NOTES
0700: Report received from Marilee VANEGAS, Discussed POC with patient . All questions answered at this time.    2000: Patient up to bathroom with assistance from RN, gait steady, patient tolerated well. Patient able to void without difficultly. Perineal care performed, elina bottle used. Patient donned new gown and underwear with pad and ice pack.     SW met with patient multiple times regarding patients children being in hospital. Patient states she has family coming to pick them up. Patient to stay on labor and delivery unit until children are picked up.     0425: Patient transferred to Postpartum room S321 via wheelchair, all patient belonging brought with patient. Report given to Nicolasa VANEGAS. Relinquished care of patient at this time.

## 2024-06-02 NOTE — DISCHARGE PLANNING
Obtained verbal permission from pt to send secure email to [facility] to see if they will send pt some formula until mom can get to [facility] for formula.

## 2024-06-02 NOTE — H&P
OB H&P:    CC: Active labor    HPI:  Ms. Ling Ojeda is a 23 y.o.  @ 38w4d by 34w2d US in active labor. Underwent precipitous delivery.     Contractions: Yes   Loss of fluid: No , but membranes spontaneously ruptured upon arrival to L&D room.   Vaginal bleeding: No   Fetal movement: present    PNC with [facility]    PNL:  Rh+, RI, HIV neg, TrepAb neg, HBsAg NR, GC/CT neg/neg  Glucola: 1 hr GTT wnl at 72  GBS negative      ROS:  Const: denies fevers, general concerns  CV/resp: reports no concerns  GI: denies abd pain, GI concerns  : see HPI  Neuro: denies HA/vision changes    OB History    Para Term  AB Living   4 3 2 1   3   SAB IAB Ectopic Molar Multiple Live Births             3      # Outcome Date GA Lbr Ramy/2nd Weight Sex Delivery Anes PTL Lv   4 Current            3 Term 23 37w0d  2.994 kg (6 lb 9.6 oz) M    DULCE   2  20 32w0d  2.495 kg (5 lb 8 oz) M  EPI  DULCE   1 Term 19 39w0d  3.629 kg (8 lb) M CS-LTranv   DULCE     GYN: denies STIs, no cervical procedures    Past Medical History:   Diagnosis Date    Iron deficiency     Urinary tract infection        Past Surgical History:   Procedure Laterality Date    PRIMARY C SECTION  2019       No current facility-administered medications on file prior to encounter.     Current Outpatient Medications on File Prior to Encounter   Medication Sig Dispense Refill    Prenatal MV-Min-Fe Fum-FA-DHA (PRENATAL 1 PO) Take  by mouth.         Family History   Problem Relation Age of Onset    Diabetes Mother     Heart Disease Mother     No Known Problems Father     No Known Problems Sister     No Known Problems Brother     Ovarian Cancer Maternal Grandmother     No Known Problems Maternal Grandfather     Cancer Paternal Grandmother     Cancer Paternal Grandfather        Social History     Tobacco Use    Smoking status: Never    Smokeless tobacco: Never   Vaping Use    Vaping status: Former    Substances: Nicotine, THC  "   Devices: Disposable   Substance Use Topics    Alcohol use: Not Currently     Comment: occ    Drug use: Not Currently     Types: Marijuana         PE:  Vitals:    24 1820   BP: 119/58   Pulse: 94   Weight: 90.7 kg (200 lb)   Height: 1.702 m (5' 7\")     Physical exam deferred due to precipitous delivery    SVE: 9cm/complete @ 18:00 per RN  FHT and tocometry unable to be obtained due to precipitous delivery    A/P: 23 y.o.  @ 38w4d by 34w2d US in active labor. Underwent precipitous delivery.    #Precipitous delivery  - Underwent  at 18:10, no lacerations, no complications  - EBL 300cc    #SIUP @ 38w4d  - Followed with [facility]  - 24 OB US showed fetal survey wnl, EFW 46.1%ile    #Prenatal Labs: Rh+, RI, HIV neg, TrepAb neg, HBsAg NR, Hepatitis C NR, GC/CT neg/neg  GBS: negative  Blood Type: A+    #HCM: Tdap declined, flu declined    Jory Diallo M.D.  PGY-1  [facility]     "

## 2024-06-02 NOTE — PROGRESS NOTES
Pt arrived to S[address] with infant in arms. Able to ambulate from wheelchair to bed. Fundus firm. Lochia light. No IV access. C/o lower abdominal cramping. Medication given before arriving to unit. Heat packs given. Pt requesting to formula feed only. Oriented to room and call light system. Pt given food and water. All needs met. Call light within reach.

## 2024-06-02 NOTE — PROGRESS NOTES
Obstetrics & Gynecology Post-Delivery Progress Note    Date of Service      23 y.o.  s/p vaginal, spontaneous  Delivery date: 2024    Events  No events    Subjective  Pain: No  Bleeding: lochia minimal  PO's: taking regular diet  Voiding: without difficulty  Ambulating: yes  Passing flatus: No  Feeding: bottlefeeding    Objective  Temp:  [36.6 °C (97.8 °F)-37 °C (98.6 °F)] 37 °C (98.6 °F)  Pulse:  [73-97] 73  Resp:  [18-22] 18  BP: (101-121)/(51-68) 111/68  SpO2:  [96 %] 96 %    Physical Exam  General: well and resting  Chest/Breasts: nipples intact   Abdomen: nontender, normal bowel sounds, non-distended  Fundus: firm and below umbilicus  Incision: not applicable, (vaginal delivery)  Perineum: deferred  Extremities: symmetric, calves nontender    Recent Labs     24  1857 24  0504   WBC 10.9* 15.6*   RBC 3.59* 3.90*   HEMOGLOBIN 10.6* 11.5*   HEMATOCRIT 32.2* 34.4*   MCV 89.7 88.2   MCH 29.5 29.5   RDW 43.8 42.8   PLATELETCT 230 266   MPV 9.7 9.8   NEUTSPOLYS 61.60  --    LYMPHOCYTES 21.00*  --    MONOCYTES 11.10  --    EOSINOPHILS 1.80  --    BASOPHILS 0.30  --        Assessment/Plan  Ling Ojeda is a 23 y.o.yo  s/p postpartum day #1  s/p vaginal, spontaneous    1. Post care: meeting all goals;  consult.  2. Hemodynamics: stable  3. Pain: controlled  4. Rh+, Rubella Immune  5. Method of Feeding: plans to bottle feed  6. Method of Contraception:  not assessed  7. Disposition: likely home postpartum day 2    VTE prophylaxis: none indicated

## 2024-06-02 NOTE — PROGRESS NOTES
175: Pt presents to L&D with c/o strong contractions, pt bearing down with contractions.  SVE /0, Dr. Krishnamurthy updated.  : , viable female per Dr. Krishnamurthy, infant assigned 8/9 APGARS, infant skin to skin with pt. Report given to Marilee VANEGAS.

## 2024-06-02 NOTE — PROGRESS NOTES
1810 Report received from ROMINA Stubbs. Assuming care. Pt resting comfortably in bed after delivery. No needs at this time.     1900 Report given to ROMINA Etienne. POC discussed, care relinquished.

## 2024-06-02 NOTE — DISCHARGE INSTRUCTIONS
Pelvic rest x6 weeks  No heavy lifting x4 weeks   Return for follow up visit in 5 week(s).  Call or come to ED for: heavy vaginal bleeding, fever >100.4, severe abdominal pain, severe headache, chest pain, shortness of breath,  N/V, or other concerns.  PATIENT DISCHARGE EDUCATION INSTRUCTION SHEET    REASONS TO CALL YOUR OBSTETRICIAN  Persistent fever, shaking, chills (Temperature higher than 100.4) may indicate you have an infection  Heavy bleeding: soaking more than 1 pad per hour; Passing clots an egg-sized clot or bigger may mean you have an postpartum hemorrhage  Foul odor from vagina or bad smelling or discolored discharge or blood  Breast infection (Mastitis symptoms); breast pain, chills, fever, redness or red streaks, may feel flu like symptoms  Urinary pain, burning or frequency  Incision that is not healing, increased redness, swelling, tenderness or pain, or any pus from episiotomy or  site may mean you have an infection  Redness, swelling, warmth, or painful to touch in the calf area of your leg may mean you have a blood clot  Severe or intensified depression, thoughts or feelings of wanting to hurt yourself or someone else   Pain in chest, obstructed breathing or shortness of breath (trouble catching your breath) may mean you are having a postpartum complication. Call your provider immediately   Headache that does not get better, even after taking medicine, a bad headache with vision changes or pain in the upper right area of your belly may mean you have high blood pressure or post birth preeclampsia. Call your provider immediately    HAND WASHING  All family and friends should wash their hands:  Before and after holding the baby  Before feeding the baby  After using the restroom or changing the baby's diaper    WOUND CARE  Ask your physician for additional care instructions. In general:   Incision:  May shower and pat incision dry   Keep the incision clean and dry  There should not  be any opening or pus from the incision  Continue to walk at home 3 times a day   Do NOT lift anything heavier than your baby (over 10 pounds)  Encourage family to help participate in care of the  to allow rest and mom time to heal  Episiotomy/Laceration  May use elina-spray bottle, witch hazel pads and dermaplast spray for comfort  Use elina-spray bottle after urinating to cleanse perineal area  To prevent burning during urination spray elina-water bottle on labial area   Pat perineal area dry until episiotomy/laceration is healed  Continue to use elina-bottle until bleeding stops as needed  If have a 2nd degree laceration or greater, a Sitz bath can offer relief from soreness, burning, and inflammation   Sitz Bath   Sit in 6 inches of warm water and soak laceration as needed until the laceration heals    VAGINAL CARE AND BLEEDING  Nothing inside vagina for 6 weeks:   No sexual intercourse, tampons or douching  Bleeding may continue for 2-4 weeks. Amount and color may vary  Soaking 1 pad or more in an hour for several hours is considered heavy bleeding  Passing large egg sized blood clots can be concerning  If you feel like you have heavy bleeding or are having increasing amount of blood clots call your Obstetrician immediately  If you begin feeling faint upon standing, feeling sick to your stomach, have clammy skin, a really fast heartbeat, have chills, start feeling confused, dizzy, sleepy or weak, or feeling like you're going to faint call your Obstetrician immediately    HYPERTENSION   Preeclampsia or gestational hypertension are types of high blood pressure that only pregnant women can get. It is important for you to be aware of symptoms to seek early intervention and treatment. If you have any of these symptoms immediately call your Obstetrician    Vision changes or blurred vision   Severe headache or pain that is unrelieved with medication and will not go away  Persistent pain in upper abdomen or shoulder  "  Increased swelling of face, feet, or hands  Difficulty breathing or shortness of breath at rest  Urinating less than usual    URINATION AND BOWEL MOVEMENTS  Eating more fiber (bran cereal, fruits, and vegetables) and drinking plenty of fluids will help to avoid constipation  Urinary frequency and urgency after childbirth is normal  If you experience any urinary pain, burning or frequency call your provider    BIRTH CONTROL  It is possible to become pregnant at any time after delivery and while breastfeeding  Plan to discuss a method of birth control with your physician at your post delivery follow up visit    POSTPARTUM BLUES  During the first few days after birth, you may experience a sense of the \"blues\" which may include impatience, irritability or even crying. These feelings come and go quickly. However, as many as 1 in 10 women experience emotional symptoms known as postpartum depression.     POSTPARTUM DEPRESSION    May start as early as the second or third day after delivery or take several weeks or months to develop. Symptoms of \"blues\" are present, but are more intense: Crying spells; loss of appetite; feelings of hopelessness or loss of control; fear of touching the baby; over concern or no concern at all about the baby; little or no concern about your own appearance/caring for yourself; and/or inability to sleep or excessive sleeping. Contact your Obstetrician if you are experiencing any of these symptoms     PREVENTING SHAKEN BABY  If you are angry or stressed, PUT THE BABY IN THE CRIB, step away, take some deep breaths, and wait until you are calm to care for the baby. DO NOT SHAKE THE BABY. You are not alone, call a supporter for help.  [facility] 24/7 crisis call line (367-117-9881) or (1-204.735.7095)  You can also text them, text \"ANSWER\" (417522)  "

## 2024-06-02 NOTE — L&D DELIVERY NOTE
Vaginal Delivery Procedure Note:    Ling Ojeda is a 23 y.o. , admitted for active labor.  Her labor course was precipitous, cervical exam was 9cm on arrival. She progressed quickly to .     PreDelivery Diagnosis:  1. SIUP @ 38w4d  2. Hx of  for NRFHT  3. Late to prenatal care  4. Hx of normocytic anemia    PostDelivery Diagnosis:  1. S/p   2. Hx of  for NRFHT  3. Late to prenatal care  4. Hx of normocytic anemia    Procedure in Detail:  Pt pushing dorsal lithotomy position.  Head delivered easily and restituted towards maternal RODRIGUEZ.  Anterior shoulder followed easily with gentle downwards pressure followed by the posterior shoulder and body.  female infant delivered @ 18:10.  There was no nuchal cord.  Infant was placed on the maternal abdomen and was stimulated and bulb suctioned.  Cord clamping was delayed x60 seconds then the cord was clamped x2 and cut.  Infant APGARs 8 and 9 at 1 and 5 minutes, respectively.  Birth weight pending.  Cord gases were not sent.  IV pitocin bolus started.  Placenta delivered spontaneously intact at 18:15. 3 Vessel cord.  The vagina and perineum were examined revealing no lacerations.  The uterus was firm with IV pitocin and fundal massage.  Pt and infant left bonding in LDR.    EBL 300cc  Anesthesia - None  Sponge and needle counts correct.  Patient tolerated procedure well.    Anticipate routine postpartum care.      Jory Diallo M.D.  [facility]  PGY-1

## 2024-06-03 NOTE — PROGRESS NOTES
Patient is ready to discharge per MD order. All discharge education and instructions reviewed with patient. AVS reviewed and signed, copy provided to patient. Patient states she has no questions and she feels ready to discharge home with baby. Tdap given. Patient escorted out of facility by PP staff.

## 2024-07-17 ENCOUNTER — APPOINTMENT (OUTPATIENT)
Dept: OBGYN | Facility: CLINIC | Age: 24
End: 2024-07-17
Payer: MEDICAID

## 2024-08-14 ENCOUNTER — APPOINTMENT (OUTPATIENT)
Dept: OBGYN | Facility: CLINIC | Age: 24
End: 2024-08-14
Payer: MEDICAID

## 2025-03-10 ENCOUNTER — HOSPITAL ENCOUNTER (EMERGENCY)
Facility: MEDICAL CENTER | Age: 25
End: 2025-03-10
Attending: EMERGENCY MEDICINE
Payer: MEDICAID

## 2025-03-10 ENCOUNTER — TELEPHONE (OUTPATIENT)
Dept: OBGYN | Facility: CLINIC | Age: 25
End: 2025-03-10
Payer: MEDICAID

## 2025-03-10 VITALS
SYSTOLIC BLOOD PRESSURE: 110 MMHG | HEART RATE: 77 BPM | DIASTOLIC BLOOD PRESSURE: 68 MMHG | RESPIRATION RATE: 17 BRPM | BODY MASS INDEX: 26.19 KG/M2 | WEIGHT: 166.89 LBS | HEIGHT: 67 IN | TEMPERATURE: 98.2 F | OXYGEN SATURATION: 100 %

## 2025-03-10 DIAGNOSIS — R51.9 NONINTRACTABLE HEADACHE, UNSPECIFIED CHRONICITY PATTERN, UNSPECIFIED HEADACHE TYPE: ICD-10-CM

## 2025-03-10 LAB — S PYO DNA SPEC NAA+PROBE: NOT DETECTED

## 2025-03-10 PROCEDURE — 36415 COLL VENOUS BLD VENIPUNCTURE: CPT

## 2025-03-10 PROCEDURE — 96374 THER/PROPH/DIAG INJ IV PUSH: CPT

## 2025-03-10 PROCEDURE — 96375 TX/PRO/DX INJ NEW DRUG ADDON: CPT

## 2025-03-10 PROCEDURE — 700111 HCHG RX REV CODE 636 W/ 250 OVERRIDE (IP): Performed by: EMERGENCY MEDICINE

## 2025-03-10 PROCEDURE — 99284 EMERGENCY DEPT VISIT MOD MDM: CPT

## 2025-03-10 PROCEDURE — 87651 STREP A DNA AMP PROBE: CPT

## 2025-03-10 PROCEDURE — 700105 HCHG RX REV CODE 258: Mod: UD | Performed by: EMERGENCY MEDICINE

## 2025-03-10 RX ORDER — KETOROLAC TROMETHAMINE 15 MG/ML
15 INJECTION, SOLUTION INTRAMUSCULAR; INTRAVENOUS ONCE
Status: COMPLETED | OUTPATIENT
Start: 2025-03-10 | End: 2025-03-10

## 2025-03-10 RX ORDER — SODIUM CHLORIDE 9 MG/ML
1000 INJECTION, SOLUTION INTRAVENOUS ONCE
Status: COMPLETED | OUTPATIENT
Start: 2025-03-10 | End: 2025-03-10

## 2025-03-10 RX ORDER — DIPHENHYDRAMINE HYDROCHLORIDE 50 MG/ML
25 INJECTION, SOLUTION INTRAMUSCULAR; INTRAVENOUS ONCE
Status: COMPLETED | OUTPATIENT
Start: 2025-03-10 | End: 2025-03-10

## 2025-03-10 RX ORDER — PROCHLORPERAZINE EDISYLATE 5 MG/ML
10 INJECTION INTRAMUSCULAR; INTRAVENOUS ONCE
Status: COMPLETED | OUTPATIENT
Start: 2025-03-10 | End: 2025-03-10

## 2025-03-10 RX ADMIN — SODIUM CHLORIDE 1000 ML: 9 INJECTION, SOLUTION INTRAVENOUS at 17:45

## 2025-03-10 RX ADMIN — KETOROLAC TROMETHAMINE 15 MG: 15 INJECTION, SOLUTION INTRAMUSCULAR; INTRAVENOUS at 17:47

## 2025-03-10 RX ADMIN — DIPHENHYDRAMINE HYDROCHLORIDE 25 MG: 50 INJECTION, SOLUTION INTRAMUSCULAR; INTRAVENOUS at 17:46

## 2025-03-10 RX ADMIN — PROCHLORPERAZINE EDISYLATE 10 MG: 5 INJECTION INTRAMUSCULAR; INTRAVENOUS at 17:48

## 2025-03-10 NOTE — ED PROVIDER NOTES
ED Provider Note    CHIEF COMPLAINT  Chief Complaint   Patient presents with    Headache       EXTERNAL RECORDS REVIEWED  Inpatient Notes Discharge note 6/2/24 on the patient had been admitted for normal spontaneous vaginal delivery.    HPI/ROS  LIMITATION TO HISTORY   Select: : None  OUTSIDE HISTORIAN(S):  None    Ling Ojeda is a 24 y.o. female who presents to the emergency department for evaluation of a headache.  The patient states that she has had a headache over the last 2 days.  She states that is located over her forehead and down the back of her head.  She describes as pressure-like.  She states that she has tried ibuprofen with no alleviation.  She denies any fevers.  She denies any changes in vision or focal neurodeficits.  She has not had any runny nose, cough, congestion, or difficulty breathing.  She does admit to a sore throat.  She states that she has had similar headaches before but they usually respond to ibuprofen.  She denies any trauma.  She does not take OCPs.    PAST MEDICAL HISTORY   has a past medical history of Iron deficiency (2022) and Urinary tract infection.    SURGICAL HISTORY   has a past surgical history that includes primary c section (01/24/2019).    FAMILY HISTORY  Family History   Problem Relation Age of Onset    Diabetes Mother     Heart Disease Mother     No Known Problems Father     No Known Problems Sister     No Known Problems Brother     Ovarian Cancer Maternal Grandmother     No Known Problems Maternal Grandfather     Cancer Paternal Grandmother     Cancer Paternal Grandfather        SOCIAL HISTORY  Social History     Tobacco Use    Smoking status: Never    Smokeless tobacco: Never   Vaping Use    Vaping status: Former    Substances: Nicotine, THC    Devices: Disposable   Substance and Sexual Activity    Alcohol use: Not Currently     Comment: occ    Drug use: Not Currently     Types: Marijuana    Sexual activity: Not Currently     Partners: Male       CURRENT  "MEDICATIONS  Home Medications       Reviewed by Nneka Valentine R.N. (Registered Nurse) on 03/10/25 at 1532  Med List Status: Not Addressed     Medication Last Dose Status   acetaminophen (TYLENOL) 500 MG Tab  Active   ibuprofen (MOTRIN) 800 MG Tab  Active   Prenatal MV-Min-Fe Fum-FA-DHA (PRENATAL 1 PO)  Active                    ALLERGIES  No Known Allergies    PHYSICAL EXAM  VITAL SIGNS: /68   Pulse 77   Temp 36.8 °C (98.2 °F) (Temporal)   Resp 17   Ht 1.702 m (5' 7\")   Wt 75.7 kg (166 lb 14.2 oz)   LMP 02/21/2025 (Exact Date)   SpO2 100%   BMI 26.14 kg/m²   Constitutional: Alert and in no apparent distress.  HENT: Normocephalic atraumatic. Bilateral external ears normal. Nose normal. Mucous membranes are moist.  Eyes: Pupils are equal and reactive. Conjunctiva normal. Non-icteric sclera.   Neck: Normal range of motion without tenderness. Supple. No meningeal signs.  Cardiovascular: Regular rate and rhythm. No murmurs, gallops or rubs.  Thorax & Lungs: No retractions, nasal flaring, or tachypnea. Breath sounds are clear to auscultation bilaterally. No wheezing, rhonchi or rales.  Abdomen: Soft, nontender and nondistended. No hepatosplenomegaly.  Skin: Warm and dry. No rashes are noted.  Back: No bony tenderness, No CVA tenderness.   Extremities: 2+ peripheral pulses. Cap refill is less than 2 seconds. No edema, cyanosis, or clubbing.  Musculoskeletal: Good range of motion in all major joints. No tenderness to palpation or major deformities noted.   Neurologic: Alert and appropriate for age. The patient moves all 4 extremities without obvious deficits. Patient has symmetry of the face, specifically with eyebrow raising and smiling. Extraocular muscles are intact. Pupils equal and reactive. Visual fields intact. Tongue is midline with no fasciculations. Soft palate is symmetrical and uvula is midline. Patient is able to resist against force with head rotation bilaterally. Patient is able to shrug " shoulders. Hearing is intact bilaterally. Normal finger to nose. No pronator drift. Normal heel to toe. Sensation grossly intact. Steady gait. .    LABS  Results for orders placed or performed during the hospital encounter of 03/10/25   Group A Strep by PCR    Collection Time: 03/10/25  5:57 PM    Specimen: Throat   Result Value Ref Range    Group A Strep by PCR Not Detected Not Detected     COURSE & MEDICAL DECISION MAKING    ASSESSMENT, COURSE AND PLAN  Care Narrative: This is a 24-year-old female presenting to the emergency department for evaluation of a headache.  On initial evaluation, the patient did not appear to be in any acute distress.  Vital signs are reassuring.  Physical exam was overall reassuring with a normal neuroexam.  She had no focal deficits.  She had full range of motion of her neck.  At this point, I have very low clinical suspicion for intracranial mass or hemorrhage or stroke.  I will clinical suspicion for idiopathic intracranial hypertension as she has no risk factors.  She also denies any vision loss or changes.  I have low clinical suspicion for meningitis given the lack of fevers or infectious symptoms.    Given the reassuring exam, I do not think that she requires imaging at this time.  She did have some erythema and tonsillar hypertrophy of the posterior pharynx and soft palate.  A strep swab was obtained.  This was negative.  She had no evidence of peritonsillar, retropharyngeal abscess, bacterial tracheitis or epiglottitis.  She had no evidence of Oz's angina.    The patient was treated with a migraine cocktail.  Upon reassessment, the patient's headache has resolved.  I do think she stable for discharge at this time.  I discussed supportive measures with her and encouraged her to follow-up with her primary care physician.  She will return to the ED with any worsening signs or symptoms.    The patient presents with headache without signs of CNS bleed, stroke, infection, or other  serious etiology. The patient is neurologically intact. Given the extremely low risk of these diagnoses further testing and evaluation for these possibilities does not appear to be indicated at this time. The patient has been instructed to return if the symptoms worsen or change in any way.    Hydration: Based on the patient's presentation of Other migraine cocktail the patient was given IV fluids. IV Hydration was used because oral hydration was not adequate alone. Upon recheck following hydration, the patient was improved.    ADDITIONAL PROBLEMS MANAGED  None    DISPOSITION AND DISCUSSIONS  I have discussed management of the patient with the following physicians and NINA's: None    Discussion of management with other QHP or appropriate source(s): None     Escalation of care considered, and ultimately not performed:acute inpatient care management, however at this time, the patient is most appropriate for outpatient management    Barriers to care at this time, including but not limited to:  None .     Decision tools and prescription drugs considered including, but not limited to:  None .    FINAL IMPRESSION  1. Nonintractable headache, unspecified chronicity pattern, unspecified headache type      PRESCRIPTIONS  New Prescriptions    No medications on file     FOLLOW UP  [facility], Emergency Dept  [address] [place] [address] [id number] [phone]  Go to   As needed    -DISCHARGE-    Electronically signed by: Julia Garcia D.O., 3/10/2025 4:49 PM

## 2025-03-10 NOTE — ED TRIAGE NOTES
"Ling Ojeda  24 y.o. female    Chief Complaint   Patient presents with    Headache     Pt arrives with complaints of headache for past few days- worse when laying down. Pt also complains of feeling \"sore\" intermittently in L arm and L leg. Denies vomiting. Endorses nausea. Denies abdominal pain. Denies URI symptoms. Pain 7/10. Pain behind eyes. Endorses photophobia. Denies migraines    Vitals:    03/10/25 1515   BP: 130/83   Pulse: 94   Resp: 12   Temp: 36.8 °C (98.2 °F)   SpO2: 100%       Triage process explained to patient, apologized for wait time, and returned to [facility].  Pt informed to notify staff of any change in condition.     "

## 2025-03-10 NOTE — TELEPHONE ENCOUNTER
Pt called stating she is having hand pain headaches and N/V notified pt we can't see her today since schedule are full advice her to go to urgent care or ED if she is really consurend

## 2025-03-11 ENCOUNTER — HOSPITAL ENCOUNTER (EMERGENCY)
Facility: MEDICAL CENTER | Age: 25
End: 2025-03-12
Payer: MEDICAID

## 2025-03-11 VITALS
RESPIRATION RATE: 18 BRPM | SYSTOLIC BLOOD PRESSURE: 140 MMHG | OXYGEN SATURATION: 100 % | WEIGHT: 167.99 LBS | TEMPERATURE: 97.4 F | HEIGHT: 67 IN | HEART RATE: 73 BPM | BODY MASS INDEX: 26.37 KG/M2 | DIASTOLIC BLOOD PRESSURE: 93 MMHG

## 2025-03-11 PROCEDURE — 302449 STATCHG TRIAGE ONLY (STATISTIC)

## 2025-03-11 NOTE — ED NOTES
Bedside report received from off going RN/tech: Holli, assumed care of patient.  POC discussed with patient. Call light within reach, all needs addressed at this time.       Fall risk interventions in place: Move the patient closer to the nurse's station, Patient's personal possessions are with in their safe reach, Place socks on patient, Place fall risk sign on patient's door, Give patient urinal if applicable, Keep floor surfaces clean and dry, and Accompanied to restroom (all applicable per [place] Fall risk assessment)   Continuous monitoring: Pulse Ox or Blood Pressure  IVF/IV medications: Not Applicable   Oxygen: Room Air  Bedside sitter: Not Applicable   Isolation: Not Applicable

## 2025-03-11 NOTE — ED NOTES
Patient AAOx4. Discharge education/summary reviewed with patient, questions/concerns addressed with pt. Patient verbalized understanding of education provided. Pt ambulatory out to lobby with a steady gait, NADN. PIV removed. Pt discharged home to self care.

## 2025-03-12 NOTE — ED NOTES
PT called in lobby x3 with no response. Unable to locate PT at this time, all waiting areas checked.   Will dismiss from system.

## 2025-03-12 NOTE — ED TRIAGE NOTES
"Chief Complaint   Patient presents with    Headache     Was seen yesterday, forehead and goes back of head. Reports came back this morning and was unable to sleep due to pain. -N/V     BP (!) 140/93   Pulse 73   Temp 36.3 °C (97.4 °F) (Temporal)   Resp 18   Ht 1.702 m (5' 7\")   Wt 76.2 kg (167 lb 15.9 oz)   LMP 02/21/2025 (Exact Date)   SpO2 100%   BMI 26.31 kg/m²     Pt ambulatory to triage w/ above complaint.   Denies any medical history.  Placed pt back in lobby, educated on NPO status.     "